# Patient Record
Sex: FEMALE | Race: ASIAN | NOT HISPANIC OR LATINO
[De-identification: names, ages, dates, MRNs, and addresses within clinical notes are randomized per-mention and may not be internally consistent; named-entity substitution may affect disease eponyms.]

---

## 2023-06-22 PROBLEM — Z00.00 ENCOUNTER FOR PREVENTIVE HEALTH EXAMINATION: Status: ACTIVE | Noted: 2023-06-22

## 2023-07-06 ENCOUNTER — APPOINTMENT (OUTPATIENT)
Dept: OBGYN | Facility: CLINIC | Age: 33
End: 2023-07-06
Payer: COMMERCIAL

## 2023-07-06 PROCEDURE — 36415 COLL VENOUS BLD VENIPUNCTURE: CPT

## 2023-07-06 PROCEDURE — 76830 TRANSVAGINAL US NON-OB: CPT

## 2023-07-06 PROCEDURE — 99214 OFFICE O/P EST MOD 30 MIN: CPT | Mod: 25

## 2023-07-19 ENCOUNTER — APPOINTMENT (OUTPATIENT)
Dept: OBGYN | Facility: CLINIC | Age: 33
End: 2023-07-19
Payer: COMMERCIAL

## 2023-07-19 PROCEDURE — 36415 COLL VENOUS BLD VENIPUNCTURE: CPT

## 2023-07-19 PROCEDURE — 0500F INITIAL PRENATAL CARE VISIT: CPT

## 2023-08-09 ENCOUNTER — APPOINTMENT (OUTPATIENT)
Dept: OBGYN | Facility: CLINIC | Age: 33
End: 2023-08-09
Payer: COMMERCIAL

## 2023-08-09 PROCEDURE — 0502F SUBSEQUENT PRENATAL CARE: CPT

## 2023-08-09 PROCEDURE — 36415 COLL VENOUS BLD VENIPUNCTURE: CPT

## 2023-08-09 PROCEDURE — 76813 OB US NUCHAL MEAS 1 GEST: CPT

## 2023-09-05 ENCOUNTER — APPOINTMENT (OUTPATIENT)
Dept: OBGYN | Facility: CLINIC | Age: 33
End: 2023-09-05
Payer: COMMERCIAL

## 2023-09-05 PROCEDURE — 0502F SUBSEQUENT PRENATAL CARE: CPT

## 2023-09-05 PROCEDURE — 36415 COLL VENOUS BLD VENIPUNCTURE: CPT

## 2023-09-20 ENCOUNTER — TRANSCRIPTION ENCOUNTER (OUTPATIENT)
Age: 33
End: 2023-09-20

## 2023-10-03 ENCOUNTER — ASOB RESULT (OUTPATIENT)
Age: 33
End: 2023-10-03

## 2023-10-03 ENCOUNTER — APPOINTMENT (OUTPATIENT)
Dept: ANTEPARTUM | Facility: CLINIC | Age: 33
End: 2023-10-03
Payer: COMMERCIAL

## 2023-10-03 PROCEDURE — 76811 OB US DETAILED SNGL FETUS: CPT

## 2023-10-10 ENCOUNTER — APPOINTMENT (OUTPATIENT)
Dept: OBGYN | Facility: CLINIC | Age: 33
End: 2023-10-10
Payer: COMMERCIAL

## 2023-10-10 PROCEDURE — 90471 IMMUNIZATION ADMIN: CPT

## 2023-10-10 PROCEDURE — 90686 IIV4 VACC NO PRSV 0.5 ML IM: CPT

## 2023-10-10 PROCEDURE — 0502F SUBSEQUENT PRENATAL CARE: CPT

## 2023-10-30 ENCOUNTER — APPOINTMENT (OUTPATIENT)
Dept: OBGYN | Facility: CLINIC | Age: 33
End: 2023-10-30
Payer: COMMERCIAL

## 2023-10-30 PROCEDURE — 0502F SUBSEQUENT PRENATAL CARE: CPT

## 2023-11-20 ENCOUNTER — APPOINTMENT (OUTPATIENT)
Dept: OBGYN | Facility: CLINIC | Age: 33
End: 2023-11-20
Payer: COMMERCIAL

## 2023-11-20 PROCEDURE — 0502F SUBSEQUENT PRENATAL CARE: CPT

## 2023-11-20 PROCEDURE — 90471 IMMUNIZATION ADMIN: CPT

## 2023-11-20 PROCEDURE — 36415 COLL VENOUS BLD VENIPUNCTURE: CPT

## 2023-11-20 PROCEDURE — 90715 TDAP VACCINE 7 YRS/> IM: CPT

## 2023-12-18 ENCOUNTER — APPOINTMENT (OUTPATIENT)
Dept: OBGYN | Facility: CLINIC | Age: 33
End: 2023-12-18
Payer: COMMERCIAL

## 2023-12-18 PROCEDURE — 76819 FETAL BIOPHYS PROFIL W/O NST: CPT | Mod: 59

## 2023-12-18 PROCEDURE — 0502F SUBSEQUENT PRENATAL CARE: CPT

## 2023-12-18 PROCEDURE — 76816 OB US FOLLOW-UP PER FETUS: CPT

## 2024-01-03 ENCOUNTER — APPOINTMENT (OUTPATIENT)
Dept: OBGYN | Facility: CLINIC | Age: 34
End: 2024-01-03
Payer: COMMERCIAL

## 2024-01-03 PROCEDURE — 0502F SUBSEQUENT PRENATAL CARE: CPT

## 2024-01-17 ENCOUNTER — APPOINTMENT (OUTPATIENT)
Dept: OBGYN | Facility: CLINIC | Age: 34
End: 2024-01-17
Payer: COMMERCIAL

## 2024-01-17 PROCEDURE — 90471 IMMUNIZATION ADMIN: CPT

## 2024-01-17 PROCEDURE — 90678 RSV VACC PREF BIVALENT IM: CPT

## 2024-01-17 PROCEDURE — 0502F SUBSEQUENT PRENATAL CARE: CPT

## 2024-01-24 ENCOUNTER — APPOINTMENT (OUTPATIENT)
Dept: OBGYN | Facility: CLINIC | Age: 34
End: 2024-01-24
Payer: COMMERCIAL

## 2024-01-24 PROCEDURE — 0502F SUBSEQUENT PRENATAL CARE: CPT

## 2024-01-24 PROCEDURE — 76816 OB US FOLLOW-UP PER FETUS: CPT

## 2024-01-24 PROCEDURE — 76819 FETAL BIOPHYS PROFIL W/O NST: CPT | Mod: 59

## 2024-01-26 ENCOUNTER — INPATIENT (INPATIENT)
Facility: HOSPITAL | Age: 34
LOS: 2 days | Discharge: HOME | End: 2024-01-29
Attending: OBSTETRICS & GYNECOLOGY | Admitting: OBSTETRICS & GYNECOLOGY
Payer: COMMERCIAL

## 2024-01-26 ENCOUNTER — TRANSCRIPTION ENCOUNTER (OUTPATIENT)
Age: 34
End: 2024-01-26

## 2024-01-26 VITALS — OXYGEN SATURATION: 97 %

## 2024-01-26 DIAGNOSIS — O26.899 OTHER SPECIFIED PREGNANCY RELATED CONDITIONS, UNSPECIFIED TRIMESTER: ICD-10-CM

## 2024-01-26 DIAGNOSIS — Z34.80 ENCOUNTER FOR SUPERVISION OF OTHER NORMAL PREGNANCY, UNSPECIFIED TRIMESTER: ICD-10-CM

## 2024-01-26 LAB
ALBUMIN SERPL ELPH-MCNC: 3.7 G/DL — SIGNIFICANT CHANGE UP (ref 3.3–5)
ALP SERPL-CCNC: 168 U/L — HIGH (ref 40–120)
ALT FLD-CCNC: 21 U/L — SIGNIFICANT CHANGE UP (ref 10–45)
ANION GAP SERPL CALC-SCNC: 14 MMOL/L — SIGNIFICANT CHANGE UP (ref 5–17)
APTT BLD: 28.3 SEC — SIGNIFICANT CHANGE UP (ref 24.5–35.6)
AST SERPL-CCNC: 25 U/L — SIGNIFICANT CHANGE UP (ref 10–40)
BASOPHILS # BLD AUTO: 0.02 K/UL — SIGNIFICANT CHANGE UP (ref 0–0.2)
BASOPHILS NFR BLD AUTO: 0.2 % — SIGNIFICANT CHANGE UP (ref 0–2)
BILIRUB SERPL-MCNC: 0.2 MG/DL — SIGNIFICANT CHANGE UP (ref 0.2–1.2)
BLD GP AB SCN SERPL QL: NEGATIVE — SIGNIFICANT CHANGE UP
BUN SERPL-MCNC: 8 MG/DL — SIGNIFICANT CHANGE UP (ref 7–23)
CALCIUM SERPL-MCNC: 9.9 MG/DL — SIGNIFICANT CHANGE UP (ref 8.4–10.5)
CHLORIDE SERPL-SCNC: 100 MMOL/L — SIGNIFICANT CHANGE UP (ref 96–108)
CO2 SERPL-SCNC: 21 MMOL/L — LOW (ref 22–31)
CREAT SERPL-MCNC: 0.43 MG/DL — LOW (ref 0.5–1.3)
EGFR: 132 ML/MIN/1.73M2 — SIGNIFICANT CHANGE UP
EOSINOPHIL # BLD AUTO: 0.04 K/UL — SIGNIFICANT CHANGE UP (ref 0–0.5)
EOSINOPHIL NFR BLD AUTO: 0.4 % — SIGNIFICANT CHANGE UP (ref 0–6)
FIBRINOGEN PPP-MCNC: 570 MG/DL — HIGH (ref 200–445)
GLUCOSE SERPL-MCNC: 116 MG/DL — HIGH (ref 70–99)
HCT VFR BLD CALC: 41.2 % — SIGNIFICANT CHANGE UP (ref 34.5–45)
HGB BLD-MCNC: 14.1 G/DL — SIGNIFICANT CHANGE UP (ref 11.5–15.5)
IMM GRANULOCYTES NFR BLD AUTO: 0.5 % — SIGNIFICANT CHANGE UP (ref 0–0.9)
INR BLD: 0.93 RATIO — SIGNIFICANT CHANGE UP (ref 0.85–1.18)
LDH SERPL L TO P-CCNC: 188 U/L — SIGNIFICANT CHANGE UP (ref 50–242)
LYMPHOCYTES # BLD AUTO: 1.75 K/UL — SIGNIFICANT CHANGE UP (ref 1–3.3)
LYMPHOCYTES # BLD AUTO: 17.3 % — SIGNIFICANT CHANGE UP (ref 13–44)
MCHC RBC-ENTMCNC: 30.7 PG — SIGNIFICANT CHANGE UP (ref 27–34)
MCHC RBC-ENTMCNC: 34.2 GM/DL — SIGNIFICANT CHANGE UP (ref 32–36)
MCV RBC AUTO: 89.6 FL — SIGNIFICANT CHANGE UP (ref 80–100)
MONOCYTES # BLD AUTO: 0.93 K/UL — HIGH (ref 0–0.9)
MONOCYTES NFR BLD AUTO: 9.2 % — SIGNIFICANT CHANGE UP (ref 2–14)
NEUTROPHILS # BLD AUTO: 7.33 K/UL — SIGNIFICANT CHANGE UP (ref 1.8–7.4)
NEUTROPHILS NFR BLD AUTO: 72.4 % — SIGNIFICANT CHANGE UP (ref 43–77)
NRBC # BLD: 0 /100 WBCS — SIGNIFICANT CHANGE UP (ref 0–0)
PLATELET # BLD AUTO: 272 K/UL — SIGNIFICANT CHANGE UP (ref 150–400)
POTASSIUM SERPL-MCNC: 3.6 MMOL/L — SIGNIFICANT CHANGE UP (ref 3.5–5.3)
POTASSIUM SERPL-SCNC: 3.6 MMOL/L — SIGNIFICANT CHANGE UP (ref 3.5–5.3)
PROT SERPL-MCNC: 6.5 G/DL — SIGNIFICANT CHANGE UP (ref 6–8.3)
PROTHROM AB SERPL-ACNC: 9.8 SEC — SIGNIFICANT CHANGE UP (ref 9.5–13)
RBC # BLD: 4.6 M/UL — SIGNIFICANT CHANGE UP (ref 3.8–5.2)
RBC # FLD: 14 % — SIGNIFICANT CHANGE UP (ref 10.3–14.5)
RH IG SCN BLD-IMP: POSITIVE — SIGNIFICANT CHANGE UP
SODIUM SERPL-SCNC: 135 MMOL/L — SIGNIFICANT CHANGE UP (ref 135–145)
URATE SERPL-MCNC: 4 MG/DL — SIGNIFICANT CHANGE UP (ref 2.5–7)
WBC # BLD: 10.12 K/UL — SIGNIFICANT CHANGE UP (ref 3.8–10.5)
WBC # FLD AUTO: 10.12 K/UL — SIGNIFICANT CHANGE UP (ref 3.8–10.5)

## 2024-01-26 RX ORDER — CHLORHEXIDINE GLUCONATE 213 G/1000ML
1 SOLUTION TOPICAL DAILY
Refills: 0 | Status: DISCONTINUED | OUTPATIENT
Start: 2024-01-26 | End: 2024-01-27

## 2024-01-26 RX ORDER — OXYTOCIN 10 UNIT/ML
333.33 VIAL (ML) INJECTION
Qty: 20 | Refills: 0 | Status: DISCONTINUED | OUTPATIENT
Start: 2024-01-26 | End: 2024-01-27

## 2024-01-26 RX ORDER — SODIUM CHLORIDE 9 MG/ML
1000 INJECTION, SOLUTION INTRAVENOUS
Refills: 0 | Status: DISCONTINUED | OUTPATIENT
Start: 2024-01-26 | End: 2024-01-27

## 2024-01-26 RX ORDER — CITRIC ACID/SODIUM CITRATE 300-500 MG
15 SOLUTION, ORAL ORAL EVERY 6 HOURS
Refills: 0 | Status: DISCONTINUED | OUTPATIENT
Start: 2024-01-26 | End: 2024-01-27

## 2024-01-26 NOTE — OB RN PATIENT PROFILE - RUBELLA: DATE, OB PROFILE
Message sent to dr. Aixa Pugh about consult via perfect serve. Message read.     Dr. Lance Orellana notified about admission, orders received 06-Jul-2023 07-Jul-2023

## 2024-01-26 NOTE — OB PROVIDER H&P - NSHPPHYSICALEXAM_GEN_ALL_CORE
T(C): 36.6 (01-26-24 @ 22:07), Max: 36.6 (01-26-24 @ 22:02)  HR: 98 (01-26-24 @ 22:41) (98 - 209)  BP: 145/101 (01-26-24 @ 22:18) (145/101 - 155/103)  RR: 18 (01-26-24 @ 22:07) (18 - 18)  SpO2: 96% (01-26-24 @ 22:41) (86% - 98%)    Gen: NAD, well-appearing, AAOx3   Abd: Soft, gravid  Ext: non-tender, non-edematous  SSE: +pooling clear fluid, +nitrazine  SVE:  1/70/-3  Bedside sono: vertex  FHT: 140bpm, mod, +accel, -decels  [place]: q2-5 min

## 2024-01-26 NOTE — OB PROVIDER H&P - ASSESSMENT
A/P: 33y  at 37w1d GA admitted to L&D for PROM@9:30pm  -Admit to L&D  -Admission labs  -CLD  -IV fluids  -Labor: IOL with PO cytotec, cervical balloon, pitocin  -Fetus: Cat I tracing. Continuous toco and fetal monitoring.   -GBS: Negative, no GBS ppx required   -HELLP labs for elevated BP on admission  -Analgesia: epidural PRN    Discussed with Dr. Renato Schaefer PGY2

## 2024-01-26 NOTE — OB PROVIDER H&P - ATTENDING COMMENTS
Agree with above  Patient with PROM  PNC complicated by mild range BPs  Will admit for IOL for PROM with PO/CB and pitocin in AM  HELLP labs    kayden anderson

## 2024-01-26 NOTE — OB PROVIDER H&P - HISTORY OF PRESENT ILLNESS
33y  at 37w1d GA who presents to L&D with complaints of leakage of "yellowish" fluid since 9:30pm.  Endorses decreased fetal movement since that time.  Patient with contractions on toco, although patient not feeling contractions.  -VB Denies fevers, chills, nausea, vomiting, chest pain, SOB, dizziness, RUQ pain, epigastric pain, and headache. No other complaints at this time.     FRAN: 2/15/24  EFW: 2950 by patient report from sono two days ago  GBS: negative    Prenatal course is significant for: BP in 140/90s at most recent OBGYN visit.  Has been monitoring at home with similar values.  Has not been diagnosed with hypertensive disorder during this visit.      Obhx:  - current  Gynhx: Denies fibroids, ovarian cysts, STIs, abnormal PAPs   PMH: Denies, including HTN, DM, asthma  PSH: Denies  Soc hx: Denies alcohol, tobacco and illicit drug use during this pregnancy  Anx/dep:  Denies  Meds: PNV, Bonyazanstjb  Allergies: NKDA    Will accept blood transfusion

## 2024-01-26 NOTE — OB PROVIDER H&P - NSLOWPPHRISK_OBGYN_A_OB
No previous uterine incision/Mortensen Pregnancy/Less than or equal to 4 previous vaginal births/No known bleeding disorder/No history of postpartum hemorrhage/No other PPH risks indicated

## 2024-01-26 NOTE — OB RN TRIAGE NOTE - FALL HARM RISK - UNIVERSAL INTERVENTIONS
Bed in lowest position, wheels locked, appropriate side rails in place/Call bell, personal items and telephone in reach/Instruct patient to call for assistance before getting out of bed or chair/Non-slip footwear when patient is out of bed/[place] to call system/Physically safe environment - no spills, clutter or unnecessary equipment/Purposeful Proactive Rounding/Room/bathroom lighting operational, light cord in reach

## 2024-01-26 NOTE — OB RN PATIENT PROFILE - BP NONINVASIVE SYSTOLIC (MM HG)
High Dose Vitamin A Pregnancy And Lactation Text: High dose vitamin A therapy is contraindicated during pregnancy and breast feeding. 155

## 2024-01-26 NOTE — PRE-ANESTHESIA EVALUATION ADULT - NSANTHTOBACCOSD_GEN_ALL_CORE
Paged Dr. Banks of new pt consult for COVID pneum. No acute distress noted. Endorse to AM shift nurse.    No

## 2024-01-27 LAB
APPEARANCE UR: CLEAR — SIGNIFICANT CHANGE UP
BILIRUB UR-MCNC: NEGATIVE — SIGNIFICANT CHANGE UP
COLOR SPEC: YELLOW — SIGNIFICANT CHANGE UP
CREAT ?TM UR-MCNC: 46 MG/DL — SIGNIFICANT CHANGE UP
DIFF PNL FLD: NEGATIVE — SIGNIFICANT CHANGE UP
GLUCOSE UR QL: NEGATIVE MG/DL — SIGNIFICANT CHANGE UP
KETONES UR-MCNC: NEGATIVE MG/DL — SIGNIFICANT CHANGE UP
LEUKOCYTE ESTERASE UR-ACNC: NEGATIVE — SIGNIFICANT CHANGE UP
MAGNESIUM SERPL-MCNC: 4.7 MG/DL — HIGH (ref 1.6–2.6)
NITRITE UR-MCNC: NEGATIVE — SIGNIFICANT CHANGE UP
PH UR: 6.5 — SIGNIFICANT CHANGE UP (ref 5–8)
PROT ?TM UR-MCNC: 9 MG/DL — SIGNIFICANT CHANGE UP (ref 0–12)
PROT UR-MCNC: NEGATIVE MG/DL — SIGNIFICANT CHANGE UP
PROT/CREAT UR-RTO: 0.2 RATIO — SIGNIFICANT CHANGE UP (ref 0–0.2)
SP GR SPEC: 1.01 — SIGNIFICANT CHANGE UP (ref 1–1.03)
T PALLIDUM AB TITR SER: NEGATIVE — SIGNIFICANT CHANGE UP
UROBILINOGEN FLD QL: 0.2 MG/DL — SIGNIFICANT CHANGE UP (ref 0.2–1)

## 2024-01-27 PROCEDURE — 59400 OBSTETRICAL CARE: CPT

## 2024-01-27 RX ORDER — ACETAMINOPHEN 500 MG
975 TABLET ORAL
Refills: 0 | Status: DISCONTINUED | OUTPATIENT
Start: 2024-01-27 | End: 2024-01-29

## 2024-01-27 RX ORDER — OXYTOCIN 10 UNIT/ML
10 VIAL (ML) INJECTION ONCE
Refills: 0 | Status: COMPLETED | OUTPATIENT
Start: 2024-01-27 | End: 2024-01-27

## 2024-01-27 RX ORDER — LABETALOL HCL 100 MG
20 TABLET ORAL ONCE
Refills: 0 | Status: COMPLETED | OUTPATIENT
Start: 2024-01-27 | End: 2024-01-27

## 2024-01-27 RX ORDER — OXYTOCIN 10 UNIT/ML
4 VIAL (ML) INJECTION
Qty: 30 | Refills: 0 | Status: DISCONTINUED | OUTPATIENT
Start: 2024-01-27 | End: 2024-01-27

## 2024-01-27 RX ORDER — SODIUM CHLORIDE 9 MG/ML
1000 INJECTION, SOLUTION INTRAVENOUS
Refills: 0 | Status: DISCONTINUED | OUTPATIENT
Start: 2024-01-27 | End: 2024-01-29

## 2024-01-27 RX ORDER — IBUPROFEN 200 MG
600 TABLET ORAL EVERY 6 HOURS
Refills: 0 | Status: COMPLETED | OUTPATIENT
Start: 2024-01-27 | End: 2024-12-25

## 2024-01-27 RX ORDER — SIMETHICONE 80 MG/1
80 TABLET, CHEWABLE ORAL EVERY 4 HOURS
Refills: 0 | Status: DISCONTINUED | OUTPATIENT
Start: 2024-01-27 | End: 2024-01-29

## 2024-01-27 RX ORDER — PRAMOXINE HYDROCHLORIDE 150 MG/15G
1 AEROSOL, FOAM RECTAL EVERY 4 HOURS
Refills: 0 | Status: DISCONTINUED | OUTPATIENT
Start: 2024-01-27 | End: 2024-01-29

## 2024-01-27 RX ORDER — MAGNESIUM SULFATE 500 MG/ML
4 VIAL (ML) INJECTION ONCE
Refills: 0 | Status: DISCONTINUED | OUTPATIENT
Start: 2024-01-27 | End: 2024-01-27

## 2024-01-27 RX ORDER — DIPHENOXYLATE HCL/ATROPINE 2.5-.025MG
1 TABLET ORAL ONCE
Refills: 0 | Status: DISCONTINUED | OUTPATIENT
Start: 2024-01-27 | End: 2024-01-27

## 2024-01-27 RX ORDER — CARBOPROST TROMETHAMINE 250 UG/ML
250 INJECTION, SOLUTION INTRAMUSCULAR ONCE
Refills: 0 | Status: COMPLETED | OUTPATIENT
Start: 2024-01-27 | End: 2024-01-27

## 2024-01-27 RX ORDER — MAGNESIUM SULFATE 500 MG/ML
2 VIAL (ML) INJECTION
Qty: 40 | Refills: 0 | Status: DISCONTINUED | OUTPATIENT
Start: 2024-01-27 | End: 2024-01-29

## 2024-01-27 RX ORDER — IBUPROFEN 200 MG
600 TABLET ORAL EVERY 6 HOURS
Refills: 0 | Status: DISCONTINUED | OUTPATIENT
Start: 2024-01-27 | End: 2024-01-29

## 2024-01-27 RX ORDER — SENNA PLUS 8.6 MG/1
2 TABLET ORAL AT BEDTIME
Refills: 0 | Status: DISCONTINUED | OUTPATIENT
Start: 2024-01-27 | End: 2024-01-29

## 2024-01-27 RX ORDER — HYDROCORTISONE 1 %
1 OINTMENT (GRAM) TOPICAL EVERY 6 HOURS
Refills: 0 | Status: DISCONTINUED | OUTPATIENT
Start: 2024-01-27 | End: 2024-01-29

## 2024-01-27 RX ORDER — MAGNESIUM SULFATE 500 MG/ML
2 VIAL (ML) INJECTION
Qty: 40 | Refills: 0 | Status: DISCONTINUED | OUTPATIENT
Start: 2024-01-27 | End: 2024-01-27

## 2024-01-27 RX ORDER — LANOLIN
1 OINTMENT (GRAM) TOPICAL EVERY 6 HOURS
Refills: 0 | Status: DISCONTINUED | OUTPATIENT
Start: 2024-01-27 | End: 2024-01-29

## 2024-01-27 RX ORDER — OXYTOCIN 10 UNIT/ML
41.67 VIAL (ML) INJECTION
Qty: 20 | Refills: 0 | Status: DISCONTINUED | OUTPATIENT
Start: 2024-01-27 | End: 2024-01-29

## 2024-01-27 RX ORDER — MAGNESIUM HYDROXIDE 400 MG/1
30 TABLET, CHEWABLE ORAL
Refills: 0 | Status: DISCONTINUED | OUTPATIENT
Start: 2024-01-27 | End: 2024-01-29

## 2024-01-27 RX ORDER — DIPHENHYDRAMINE HCL 50 MG
25 CAPSULE ORAL EVERY 6 HOURS
Refills: 0 | Status: DISCONTINUED | OUTPATIENT
Start: 2024-01-27 | End: 2024-01-29

## 2024-01-27 RX ORDER — OXYCODONE HYDROCHLORIDE 5 MG/1
5 TABLET ORAL ONCE
Refills: 0 | Status: DISCONTINUED | OUTPATIENT
Start: 2024-01-27 | End: 2024-01-29

## 2024-01-27 RX ORDER — DIBUCAINE 1 %
1 OINTMENT (GRAM) RECTAL EVERY 6 HOURS
Refills: 0 | Status: DISCONTINUED | OUTPATIENT
Start: 2024-01-27 | End: 2024-01-29

## 2024-01-27 RX ORDER — SODIUM CHLORIDE 9 MG/ML
500 INJECTION INTRAMUSCULAR; INTRAVENOUS; SUBCUTANEOUS ONCE
Refills: 0 | Status: COMPLETED | OUTPATIENT
Start: 2024-01-27 | End: 2024-01-27

## 2024-01-27 RX ORDER — TETANUS TOXOID, REDUCED DIPHTHERIA TOXOID AND ACELLULAR PERTUSSIS VACCINE, ADSORBED 5; 2.5; 8; 8; 2.5 [IU]/.5ML; [IU]/.5ML; UG/.5ML; UG/.5ML; UG/.5ML
0.5 SUSPENSION INTRAMUSCULAR ONCE
Refills: 0 | Status: DISCONTINUED | OUTPATIENT
Start: 2024-01-27 | End: 2024-01-29

## 2024-01-27 RX ORDER — SODIUM CHLORIDE 9 MG/ML
3 INJECTION INTRAMUSCULAR; INTRAVENOUS; SUBCUTANEOUS EVERY 8 HOURS
Refills: 0 | Status: DISCONTINUED | OUTPATIENT
Start: 2024-01-27 | End: 2024-01-29

## 2024-01-27 RX ORDER — AER TRAVELER 0.5 G/1
1 SOLUTION RECTAL; TOPICAL EVERY 4 HOURS
Refills: 0 | Status: DISCONTINUED | OUTPATIENT
Start: 2024-01-27 | End: 2024-01-29

## 2024-01-27 RX ORDER — BENZOCAINE 10 %
1 GEL (GRAM) MUCOUS MEMBRANE EVERY 6 HOURS
Refills: 0 | Status: DISCONTINUED | OUTPATIENT
Start: 2024-01-27 | End: 2024-01-29

## 2024-01-27 RX ORDER — OXYCODONE HYDROCHLORIDE 5 MG/1
5 TABLET ORAL
Refills: 0 | Status: DISCONTINUED | OUTPATIENT
Start: 2024-01-27 | End: 2024-01-29

## 2024-01-27 RX ORDER — MAGNESIUM SULFATE 500 MG/ML
4 VIAL (ML) INJECTION ONCE
Refills: 0 | Status: COMPLETED | OUTPATIENT
Start: 2024-01-27 | End: 2024-01-27

## 2024-01-27 RX ORDER — SODIUM CHLORIDE 9 MG/ML
1000 INJECTION INTRAMUSCULAR; INTRAVENOUS; SUBCUTANEOUS
Refills: 0 | Status: DISCONTINUED | OUTPATIENT
Start: 2024-01-27 | End: 2024-01-27

## 2024-01-27 RX ORDER — OXYTOCIN 10 UNIT/ML
VIAL (ML) INJECTION
Qty: 30 | Refills: 0 | Status: DISCONTINUED | OUTPATIENT
Start: 2024-01-27 | End: 2024-01-27

## 2024-01-27 RX ORDER — SODIUM CHLORIDE 9 MG/ML
500 INJECTION, SOLUTION INTRAVENOUS ONCE
Refills: 0 | Status: COMPLETED | OUTPATIENT
Start: 2024-01-27 | End: 2024-01-27

## 2024-01-27 RX ORDER — KETOROLAC TROMETHAMINE 30 MG/ML
30 SYRINGE (ML) INJECTION ONCE
Refills: 0 | Status: DISCONTINUED | OUTPATIENT
Start: 2024-01-27 | End: 2024-01-27

## 2024-01-27 RX ADMIN — Medication 1 TABLET(S): at 14:46

## 2024-01-27 RX ADMIN — Medication 300 GRAM(S): at 13:40

## 2024-01-27 RX ADMIN — SENNA PLUS 2 TABLET(S): 8.6 TABLET ORAL at 21:39

## 2024-01-27 RX ADMIN — Medication 30 MILLIGRAM(S): at 16:38

## 2024-01-27 RX ADMIN — SODIUM CHLORIDE 1000 MILLILITER(S): 9 INJECTION INTRAMUSCULAR; INTRAVENOUS; SUBCUTANEOUS at 06:50

## 2024-01-27 RX ADMIN — Medication 4 MILLIUNIT(S)/MIN: at 04:02

## 2024-01-27 RX ADMIN — Medication 50 GM/HR: at 14:03

## 2024-01-27 RX ADMIN — Medication 10 UNIT(S): at 14:29

## 2024-01-27 RX ADMIN — SODIUM CHLORIDE 1000 MILLILITER(S): 9 INJECTION, SOLUTION INTRAVENOUS at 04:58

## 2024-01-27 RX ADMIN — Medication 20 MILLIGRAM(S): at 13:22

## 2024-01-27 RX ADMIN — Medication 600 MILLIGRAM(S): at 23:47

## 2024-01-27 RX ADMIN — Medication 30 MILLIGRAM(S): at 18:14

## 2024-01-27 RX ADMIN — Medication 2 MILLIUNIT(S)/MIN: at 07:36

## 2024-01-27 RX ADMIN — Medication 4 MILLIUNIT(S)/MIN: at 05:37

## 2024-01-27 RX ADMIN — CARBOPROST TROMETHAMINE 250 MICROGRAM(S): 250 INJECTION, SOLUTION INTRAMUSCULAR at 14:41

## 2024-01-27 RX ADMIN — Medication 975 MILLIGRAM(S): at 19:17

## 2024-01-27 NOTE — OB RN DELIVERY SUMMARY - NS_SEPSISRSKCALC_OBGYN_ALL_OB_FT
EOS calculated successfully. EOS Risk Factor: 0.5/1000 live births ([facility] national incidence); GA=37w2d; Temp=99.32; ROM=16.85; GBS='Negative'; Antibiotics='No antibiotics or any antibiotics < 2 hrs prior to birth'

## 2024-01-27 NOTE — OB PROVIDER LABOR PROGRESS NOTE - NS_SUBJECTIVE/OBJECTIVE_OBGYN_ALL_OB_FT
Pt pushing effectively but baby with deep variables
Patient seen for decelerations after Pitocin started  Cervical balloon noted to be dislodged and expelled, deflated and removed from vagina  Pitocin paused and patient repositioned
R3 OB Labor Note    S: Patient seen and examined at bedside.     T(C): 37.1 (01-27-24 @ 04:19), Max: 37.1 (01-27-24 @ 04:19)  HR: 103 (01-27-24 @ 06:42) (89 - 209)  BP: 118/57 (01-27-24 @ 06:34) (118/57 - 165/74)  BP(mean): --  ABP: --  ABP(mean): --  RR: 18 (01-27-24 @ 04:19) (18 - 18)  SpO2: 96% (01-27-24 @ 06:42) (91% - 99%)  Wt(kg): --  CVP(mm Hg): --  CI: --  CAPILLARY BLOOD GLUCOSE       N/A      01-26 @ 07:01  -  01-27 @ 06:46  --------------------------------------------------------  IN:    Lactated Ringers: 1000 mL    Lactated Ringers Bolus: 500 mL  Total IN: 1500 mL    OUT:  Total OUT: 0 mL    Total NET: 1500 mL
Pt examined for progress
At bedside for placement of CB, pt comfortable with epidural. Receiving PO cytotec.    Vital Signs Last 24 Hrs  T(C): 36.6 (26 Jan 2024 23:17), Max: 36.6 (26 Jan 2024 22:02)  T(F): 97.9 (26 Jan 2024 22:33), Max: 97.9 (26 Jan 2024 22:07)  HR: 111 (27 Jan 2024 00:26) (89 - 209)  BP: 137/89 (27 Jan 2024 00:26) (125/69 - 165/74)  RR: 18 (26 Jan 2024 23:17) (18 - 18)  SpO2: 97% (27 Jan 2024 00:26) (93% - 99%)
Pt examined for progress
Pt examined for increased pressure

## 2024-01-27 NOTE — OB RN DELIVERY SUMMARY - NSSELHIDDEN_OBGYN_ALL_OB_FT
[[email or web address]_ALL_OB_FT:[id number]],[NS_DeliveryAssist1_OBGYN_ALL_OB_FT:[id number]],[NS_DeliveryRN_OBGYN_ALL_OB_FT:[id number]]

## 2024-01-27 NOTE — OB NEONATOLOGY/PEDIATRICIAN DELIVERY SUMMARY - NSPEDSNEONOTESA_OBGYN_ALL_OB_FT
Per delivery room nurse:  Peds NP requested to attend VAVD, CII tracing. 37.2 wk baby girl born via VAVD on 24 @ 1421. 33 yr old  mother, O+, PNL neg, GBS neg 24. Maternal hx noncontributory except severe PEC on Mag. SROM clear at 2130. ROM: <17 hrs. Baby emerged with vacuum assist x2, no popoffs, NCx1 - had good tone, adequate color, spontaneous cry. Cord clamped at 30 seconds. Routine resuscitation. APGARS 8/9. Highest maternal temp 37.4C. EOS 0.49. Breast feeding. Consents hep B.

## 2024-01-27 NOTE — OB PROVIDER LABOR PROGRESS NOTE - NS_OBIHIFHRDETAILS_OBGYN_ALL_OB_FT
135/mod/+acel scalp stim, +late decel, +variable decels
145bpm, mod, -accels, +3-4 late decelerations with additional prolonged deceleration
baseline 140, mod variability, +accels, no decels
cat 2 with deep variables
cat 1 currently
cat 1
cat 1

## 2024-01-27 NOTE — OB PROVIDER DELIVERY SUMMARY - NSSELHIDDEN_OBGYN_ALL_OB_FT
[NS_DeliveryAttending1_OBGYN_ALL_OB_FT:[id number]],[NS_DeliveryAssist1_OBGYN_ALL_OB_FT:[id number]]

## 2024-01-27 NOTE — OB PROVIDER DELIVERY SUMMARY - NSPROVIDERDELIVERYNOTE_OBGYN_ALL_OB_FT
Pt noted to be +1 with recurrent deep variables with pushing. Fetal descent obtained to +2 with maternal pushing. Pt with persistent Cat II tracing with pushing. Vacuum assistance discussed with patient and partner. OB labor saftey officer made aware. Vacuum applied. 2 pulls preformed with contractions before delivery of the fetal head. No pop offs. Nuchal x1 noted and reduced. Compound presentation noted on anterior shoulder. Head, shoulders, and body delivered easily. Infant passed to mother. Cord was clamped and cut after delayed cord clamping.  Placenta delivered spontaneously, noted to be intact. Bimanual massage preformed. Uterine atony identified. 10u of IM pitocin, 1000mcg of rectal cytotec, and hemabate 250mcg administered with resolution of uterine atony. 3rd degree laceration identified. Rectal examination demonstrated intact rectal mucosa. Anal sphincter identified with Emelina clamps. Repaired in standard fashion with Vicryl suture. Vaginal exam revealed intact cervix, sulci, vaginal walls. Perineum with second degree laceration, repaired in standard fashion with Vicryl suture. Excellent hemostasis was noted. Patient stable. Count correct x 2.     Dictation:   Bharti Thorne, PGY-3 Pt noted to be +1 with recurrent deep variables with pushing. Fetal descent obtained to +2 with maternal pushing. Pt with persistent Cat II tracing with pushing. Vacuum assistance discussed with patient and partner. OB labor saftey officer made aware. Vacuum applied. 2 pulls preformed with contractions before delivery of the fetal head. No pop offs. Nuchal x1 noted and reduced. Compound presentation noted on anterior shoulder. Head, shoulders, and body delivered easily. Infant passed to mother. Cord was clamped and cut after delayed cord clamping.  Placenta delivered spontaneously, noted to be intact. Bimanual massage preformed. Uterine atony identified. 10u of IM pitocin, 1000mcg of rectal cytotec, and hemabate 250mcg administered with resolution of uterine atony. 3rd degree laceration identified. Rectal examination demonstrated intact rectal mucosa. Anal sphincter identified with Emelina clamps. Repaired in standard fashion with Vicryl suture. Vaginal exam revealed intact cervix, sulci, vaginal walls. Perineum with second degree laceration, repaired in standard fashion with Vicryl suture. Excellent hemostasis was noted. Patient stable. Count correct x 2.     Dictation: [id number]  Bharti Thorne, PGY-3

## 2024-01-27 NOTE — OB PROVIDER LABOR PROGRESS NOTE - NS_OBIHICONTRACTIONPATTERNDETAILS_OBGYN_ALL_OB_FT
every 2 min -- overall [place] units between 150-200u
every 2 min
every 2 min
q3-4min    Cook balloon inserted without difficulty, inflated 60/60. Pt tolerated well.
q3 min
q3m
every 4-5 min

## 2024-01-27 NOTE — OB PROVIDER LABOR PROGRESS NOTE - ASSESSMENT
labor down in high fowlers  start pushing in 30 min    kayden anderson
Continue pitocin  Baby tolerating high fowlers position with peanut ball  Continue amnioinfusion  BPs stable at mild range    kayden anderson
A/P:   - Labor: IOL for PROM 930p. On Pitocin, paused for cat 2 tracing. IUPC placed, AI to be started.   - Fetus: cat 2, reassuring given response to scalp   - GBS: neg   - Pain: epi in situ    Ela Ulloa, PGY-4  d/w Dr Gross  
Decision made to proceed with vacuum assisted delivery  All r/b/a discussed with patient and   Pt agrees    kayden anderson
Patient with cervical change, now s/p cervical balloon  Continue repositioning and resuscitation prn  To restart Pitocin after FHT improves  Continuous EFM/toco    Discussed with Dr. Renato Schaefer PGY2
Pt making progress  Continue pitocin and peanut ball    kayden anderson
P0 @ 37w2d admitted for PROM, receiving PO cytotec, CB placed. Labile BPs, HELLP labs wnl, will continue to monitor BPs. Cat I tracing.     - cont present management   - cont cytotec and CB  - will transition to pitocin @ 4a  - cont to monitor BPs  - cEFM/toco

## 2024-01-28 LAB
ALBUMIN SERPL ELPH-MCNC: 2.7 G/DL — LOW (ref 3.3–5)
ALP SERPL-CCNC: 125 U/L — HIGH (ref 40–120)
ALT FLD-CCNC: 18 U/L — SIGNIFICANT CHANGE UP (ref 10–45)
ANION GAP SERPL CALC-SCNC: 9 MMOL/L — SIGNIFICANT CHANGE UP (ref 5–17)
APTT BLD: 26.5 SEC — SIGNIFICANT CHANGE UP (ref 24.5–35.6)
AST SERPL-CCNC: 29 U/L — SIGNIFICANT CHANGE UP (ref 10–40)
BASOPHILS # BLD AUTO: 0.03 K/UL — SIGNIFICANT CHANGE UP (ref 0–0.2)
BASOPHILS NFR BLD AUTO: 0.2 % — SIGNIFICANT CHANGE UP (ref 0–2)
BILIRUB SERPL-MCNC: 0.3 MG/DL — SIGNIFICANT CHANGE UP (ref 0.2–1.2)
BUN SERPL-MCNC: 5 MG/DL — LOW (ref 7–23)
CALCIUM SERPL-MCNC: 7.4 MG/DL — LOW (ref 8.4–10.5)
CHLORIDE SERPL-SCNC: 102 MMOL/L — SIGNIFICANT CHANGE UP (ref 96–108)
CO2 SERPL-SCNC: 23 MMOL/L — SIGNIFICANT CHANGE UP (ref 22–31)
CREAT SERPL-MCNC: 0.54 MG/DL — SIGNIFICANT CHANGE UP (ref 0.5–1.3)
EGFR: 125 ML/MIN/1.73M2 — SIGNIFICANT CHANGE UP
EOSINOPHIL # BLD AUTO: 0.04 K/UL — SIGNIFICANT CHANGE UP (ref 0–0.5)
EOSINOPHIL NFR BLD AUTO: 0.2 % — SIGNIFICANT CHANGE UP (ref 0–6)
FIBRINOGEN PPP-MCNC: 513 MG/DL — HIGH (ref 200–445)
GLUCOSE SERPL-MCNC: 106 MG/DL — HIGH (ref 70–99)
HCT VFR BLD CALC: 34.1 % — LOW (ref 34.5–45)
HGB BLD-MCNC: 11.7 G/DL — SIGNIFICANT CHANGE UP (ref 11.5–15.5)
IMM GRANULOCYTES NFR BLD AUTO: 0.6 % — SIGNIFICANT CHANGE UP (ref 0–0.9)
INR BLD: 0.85 RATIO — SIGNIFICANT CHANGE UP (ref 0.85–1.18)
LDH SERPL L TO P-CCNC: 303 U/L — HIGH (ref 50–242)
LYMPHOCYTES # BLD AUTO: 1.32 K/UL — SIGNIFICANT CHANGE UP (ref 1–3.3)
LYMPHOCYTES # BLD AUTO: 7.6 % — LOW (ref 13–44)
MAGNESIUM SERPL-MCNC: 5.2 MG/DL — HIGH (ref 1.6–2.6)
MAGNESIUM SERPL-MCNC: 5.9 MG/DL — HIGH (ref 1.6–2.6)
MCHC RBC-ENTMCNC: 31.4 PG — SIGNIFICANT CHANGE UP (ref 27–34)
MCHC RBC-ENTMCNC: 34.3 GM/DL — SIGNIFICANT CHANGE UP (ref 32–36)
MCV RBC AUTO: 91.4 FL — SIGNIFICANT CHANGE UP (ref 80–100)
MONOCYTES # BLD AUTO: 1.41 K/UL — HIGH (ref 0–0.9)
MONOCYTES NFR BLD AUTO: 8.1 % — SIGNIFICANT CHANGE UP (ref 2–14)
NEUTROPHILS # BLD AUTO: 14.43 K/UL — HIGH (ref 1.8–7.4)
NEUTROPHILS NFR BLD AUTO: 83.3 % — HIGH (ref 43–77)
NRBC # BLD: 0 /100 WBCS — SIGNIFICANT CHANGE UP (ref 0–0)
PLATELET # BLD AUTO: 217 K/UL — SIGNIFICANT CHANGE UP (ref 150–400)
POTASSIUM SERPL-MCNC: 3.6 MMOL/L — SIGNIFICANT CHANGE UP (ref 3.5–5.3)
POTASSIUM SERPL-SCNC: 3.6 MMOL/L — SIGNIFICANT CHANGE UP (ref 3.5–5.3)
PROT SERPL-MCNC: 4.9 G/DL — LOW (ref 6–8.3)
PROTHROM AB SERPL-ACNC: 9.4 SEC — LOW (ref 9.5–13)
RBC # BLD: 3.73 M/UL — LOW (ref 3.8–5.2)
RBC # FLD: 14.4 % — SIGNIFICANT CHANGE UP (ref 10.3–14.5)
SODIUM SERPL-SCNC: 134 MMOL/L — LOW (ref 135–145)
URATE SERPL-MCNC: 4 MG/DL — SIGNIFICANT CHANGE UP (ref 2.5–7)
WBC # BLD: 17.33 K/UL — HIGH (ref 3.8–10.5)
WBC # FLD AUTO: 17.33 K/UL — HIGH (ref 3.8–10.5)

## 2024-01-28 RX ADMIN — Medication 600 MILLIGRAM(S): at 05:42

## 2024-01-28 RX ADMIN — Medication 975 MILLIGRAM(S): at 09:58

## 2024-01-28 RX ADMIN — Medication 975 MILLIGRAM(S): at 03:00

## 2024-01-28 RX ADMIN — Medication 1 TABLET(S): at 12:03

## 2024-01-28 RX ADMIN — Medication 975 MILLIGRAM(S): at 21:06

## 2024-01-28 RX ADMIN — Medication 975 MILLIGRAM(S): at 09:17

## 2024-01-28 RX ADMIN — Medication 600 MILLIGRAM(S): at 12:02

## 2024-01-28 RX ADMIN — Medication 600 MILLIGRAM(S): at 17:47

## 2024-01-28 RX ADMIN — Medication 975 MILLIGRAM(S): at 15:23

## 2024-01-28 RX ADMIN — Medication 975 MILLIGRAM(S): at 22:02

## 2024-01-28 RX ADMIN — Medication 600 MILLIGRAM(S): at 17:57

## 2024-01-28 RX ADMIN — SENNA PLUS 2 TABLET(S): 8.6 TABLET ORAL at 21:09

## 2024-01-28 RX ADMIN — Medication 975 MILLIGRAM(S): at 15:53

## 2024-01-28 RX ADMIN — Medication 600 MILLIGRAM(S): at 12:55

## 2024-01-28 NOTE — PROGRESS NOTE ADULT - SUBJECTIVE AND OBJECTIVE BOX
OB Progress Note: VAVD PPD#1    S: 34y/o now PPD#1 s/p VAVD. Patient feels well. Pain is well controlled. She is tolerating a regular diet and passing flatus. She is voiding spontaneously, and ambulating without difficulty. Denies CP/SOB. Denies lightheadedness/dizziness. Denies N/V. Patient denies HA, SOB, changes or spots in vision, new swelling in the hands and face, or RUQ/epigastric pain.     O:  Vitals:  Vital Signs Last 24 Hrs  T(C): 36.4 (28 Jan 2024 06:00), Max: 37.4 (27 Jan 2024 12:51)  T(F): 97.6 (28 Jan 2024 06:00), Max: 99.32 (27 Jan 2024 12:51)  HR: 89 (28 Jan 2024 06:00) (86 - 141)  BP: 115/76 (28 Jan 2024 06:00) (100/63 - 176/107)  BP(mean): 94 (27 Jan 2024 18:11) (94 - 94)  RR: 18 (28 Jan 2024 06:00) (16 - 19)  SpO2: 98% (28 Jan 2024 06:00) (91% - 100%)    Parameters below as of 28 Jan 2024 06:00  Patient On (Oxygen Delivery Method): room air      Labs:  Blood type: O Positive  Rubella IgG: RPR: Negative                          11.7   17.33<H> >-----------< 217    ( 01-28 @ 02:23 )             34.1<L>                        14.1   10.12 >-----------< 272    ( 01-26 @ 22:50 )             41.2    01-28-24 @ 02:23      134<L>  |  102  |  5<L>  ----------------------------<  106<H>  3.6   |  23  |  0.54    01-26-24 @ 22:50      135  |  100  |  8   ----------------------------<  116<H>  3.6   |  21<L>  |  0.43<L>        Ca    7.4<L>      28 Jan 2024 02:23  Ca    9.9      26 Jan 2024 22:50  Mg     5.2<H>     01-28  Mg     4.7<H>     01-27    TPro  4.9<L>  /  Alb  2.7<L>  /  TBili  0.3  /  DBili  x   /  AST  29  /  ALT  18  /  AlkPhos  125<H>  01-28-24 @ 02:23  TPro  6.5  /  Alb  3.7  /  TBili  0.2  /  DBili  x   /  AST  25  /  ALT  21  /  AlkPhos  168<H>  01-26-24 @ 22:50      MEDICATIONS  (STANDING):  acetaminophen     Tablet .. 975 milliGRAM(s) Oral <User Schedule>  diphtheria/tetanus/pertussis (acellular) Vaccine (Adacel) 0.5 milliLiter(s) IntraMuscular once  ibuprofen  Tablet. 600 milliGRAM(s) Oral every 6 hours  lactated ringers. 1000 milliLiter(s) (50 mL/Hr) IV Continuous <Continuous>  magnesium sulfate Infusion 2 Gm/Hr (50 mL/Hr) IV Continuous <Continuous>  magnesium sulfate Infusion 2 Gm/Hr (50 mL/Hr) IV Continuous <Continuous>  oxytocin Infusion 41.667 milliUNIT(s)/Min (125 mL/Hr) IV Continuous <Continuous>  prenatal multivitamin 1 Tablet(s) Oral daily  senna 2 Tablet(s) Oral at bedtime  sodium chloride 0.9% lock flush 3 milliLiter(s) IV Push every 8 hours    MEDICATIONS  (PRN):  benzocaine 20%/menthol 0.5% Spray 1 Spray(s) Topical every 6 hours PRN for Perineal discomfort  dibucaine 1% Ointment 1 Application(s) Topical every 6 hours PRN Perineal discomfort  diphenhydrAMINE 25 milliGRAM(s) Oral every 6 hours PRN Pruritus  hydrocortisone 1% Cream 1 Application(s) Topical every 6 hours PRN Moderate Pain (4-6)  lanolin Ointment 1 Application(s) Topical every 6 hours PRN nipple soreness  magnesium hydroxide Suspension 30 milliLiter(s) Oral two times a day PRN Constipation  oxyCODONE    IR 5 milliGRAM(s) Oral every 3 hours PRN Moderate to Severe Pain (4-10)  oxyCODONE    IR 5 milliGRAM(s) Oral once PRN Moderate to Severe Pain (4-10)  pramoxine 1%/zinc 5% Cream 1 Application(s) Topical every 4 hours PRN Moderate Pain (4-6)  simethicone 80 milliGRAM(s) Chew every 4 hours PRN Gas  witch hazel Pads 1 Application(s) Topical every 4 hours PRN Perineal discomfort      Physical Exam:  General: NAD  Abdomen: soft, non-tender, non-distended, fundus firm  Vaginal: Lochia wnl  Extremities: No erythema/edema

## 2024-01-28 NOTE — PROGRESS NOTE ADULT - ATTENDING COMMENTS
I agree with the resident's note above.    Patient is doing well. Pain is well controlled. Tolerating regular diet, ambulating, and voiding.  Vital signs stable  BPs controlled    Plan:  DC mag at 24 hrs pp  Continue BP monitoring  Not currently on any anti-hypertensives but may need to start if BPs trend up  Reg diet  Ambulating  Pain control  Routine postpartum care    gchow

## 2024-01-29 ENCOUNTER — TRANSCRIPTION ENCOUNTER (OUTPATIENT)
Age: 34
End: 2024-01-29

## 2024-01-29 VITALS
HEART RATE: 74 BPM | TEMPERATURE: 98 F | OXYGEN SATURATION: 96 % | SYSTOLIC BLOOD PRESSURE: 111 MMHG | RESPIRATION RATE: 18 BRPM | DIASTOLIC BLOOD PRESSURE: 69 MMHG

## 2024-01-29 PROCEDURE — 85610 PROTHROMBIN TIME: CPT

## 2024-01-29 PROCEDURE — 36415 COLL VENOUS BLD VENIPUNCTURE: CPT

## 2024-01-29 PROCEDURE — 86901 BLOOD TYPING SEROLOGIC RH(D): CPT

## 2024-01-29 PROCEDURE — 84550 ASSAY OF BLOOD/URIC ACID: CPT

## 2024-01-29 PROCEDURE — 85384 FIBRINOGEN ACTIVITY: CPT

## 2024-01-29 PROCEDURE — 83615 LACTATE (LD) (LDH) ENZYME: CPT

## 2024-01-29 PROCEDURE — 85025 COMPLETE CBC W/AUTO DIFF WBC: CPT

## 2024-01-29 PROCEDURE — 86900 BLOOD TYPING SEROLOGIC ABO: CPT

## 2024-01-29 PROCEDURE — 82570 ASSAY OF URINE CREATININE: CPT

## 2024-01-29 PROCEDURE — 81003 URINALYSIS AUTO W/O SCOPE: CPT

## 2024-01-29 PROCEDURE — 83735 ASSAY OF MAGNESIUM: CPT

## 2024-01-29 PROCEDURE — 85730 THROMBOPLASTIN TIME PARTIAL: CPT

## 2024-01-29 PROCEDURE — 59050 FETAL MONITOR W/REPORT: CPT

## 2024-01-29 PROCEDURE — 80053 COMPREHEN METABOLIC PANEL: CPT

## 2024-01-29 PROCEDURE — 86850 RBC ANTIBODY SCREEN: CPT

## 2024-01-29 PROCEDURE — 86780 TREPONEMA PALLIDUM: CPT

## 2024-01-29 PROCEDURE — 84156 ASSAY OF PROTEIN URINE: CPT

## 2024-01-29 RX ORDER — IBUPROFEN 200 MG
1 TABLET ORAL
Qty: 0 | Refills: 0 | DISCHARGE
Start: 2024-01-29

## 2024-01-29 RX ORDER — ACETAMINOPHEN 500 MG
3 TABLET ORAL
Qty: 0 | Refills: 0 | DISCHARGE
Start: 2024-01-29

## 2024-01-29 RX ADMIN — Medication 600 MILLIGRAM(S): at 06:21

## 2024-01-29 RX ADMIN — Medication 600 MILLIGRAM(S): at 01:10

## 2024-01-29 RX ADMIN — Medication 975 MILLIGRAM(S): at 08:12

## 2024-01-29 RX ADMIN — Medication 975 MILLIGRAM(S): at 08:42

## 2024-01-29 RX ADMIN — Medication 975 MILLIGRAM(S): at 02:27

## 2024-01-29 RX ADMIN — Medication 975 MILLIGRAM(S): at 03:22

## 2024-01-29 RX ADMIN — Medication 600 MILLIGRAM(S): at 00:11

## 2024-01-29 NOTE — DISCHARGE NOTE OB - INSTRUCTIONS
Follow up with discharge apt. as directed by doctor. Take BP 2 times a day and record. If BP is 150/90 call MD. If BP is 110/60 or lower hold medication times one hour then retake BP. If still Below 110/60 call the doctor. Follow up with discharge apt. as directed by doctor.

## 2024-01-29 NOTE — PROGRESS NOTE ADULT - ASSESSMENT
A/P: 34yo PPD#1 s/p VAVD c/b sPEC/Mg.   Patient is doing well postpartum.       #sPEC  - Mg for seizure ppx for 24hours   - email Cardio OB  - AM HELLP labs  - Monitor BPs    # PP care  - Pain well controlled, continue current pain regimen  - Increase ambulation, SCDs when not ambulating  - Continue regular diet  - s/p 3rd degree tear repair, healing well.   - lochia wnl    Tarcy Levin MD, PGY-3 
A/P: 34yo PPD#2 s/p VAVD c/b sPEC/Mg. Patient is doing well postpartum.     #sPEC  - -120/70-80s overnight, ctm BP  - s/p Mg for seizure ppx for 24hours   - email Cardio OB  - HELLP labs wnl  - No signs or symptoms of PEC at this time    # PP care  - Pain well controlled, continue current pain regimen  - Increase ambulation, SCDs when not ambulating  - Continue regular diet  - s/p 3rd degree tear repair, healing well.   - Discharge planning    CRISTAL Mora, PGY3

## 2024-01-29 NOTE — DISCHARGE NOTE OB - CARE PLAN
1 Principal Discharge DX:	 (normal spontaneous vaginal delivery)  Assessment and plan of treatment:	32 yo P1 s/p VAVD c/b sPEC requiring MgS04  BPs WNL  stable for discharge

## 2024-01-29 NOTE — DISCHARGE NOTE OB - CARE PROVIDER_API CALL
Roshni Gross  Obstetrics and Gynecology  [address] [place] [address], [facility] [address]  [place] [address]  Phone: [phone]  Fax: [phone]  Follow Up Time:

## 2024-01-29 NOTE — DISCHARGE NOTE OB - MEDICATION SUMMARY - MEDICATIONS TO TAKE
I will START or STAY ON the medications listed below when I get home from the hospital:    ibuprofen 600 mg oral tablet  -- 1 tab(s) by mouth every 6 hours  -- Indication: For Supervision of other normal pregnancy, antepartum    acetaminophen 325 mg oral tablet  -- 3 tab(s) by mouth every 6 hours  -- Indication: For Supervision of other normal pregnancy, antepartum

## 2024-01-29 NOTE — PROGRESS NOTE ADULT - NS PANP COMMENT GEN_ALL_CORE FT
I agree with the resident's note above.    Patient is doing well. Pain is well controlled. Tolerating regular diet, ambulating, and voiding.    32 yo P1 s/p VAVD c/b sPEC requiring MgS04  BPs WNL  no s/s of PEC  stable for discharge    Plan:  Discharge instructions reviewed  Pt to monitor BPs at home and call office with any values of >140/90 or s/s of PEC  Reg diet  Ambulating  Pain control  Routine postpartum care    maddie blair I agree with the resident's note above.    Patient is doing well. Pain is well controlled. Tolerating regular diet, ambulating, and voiding.    32 yo P1 s/p VAVD c/b sPEC requiring MgS04  BPs WNL  stable for discharge    Plan:  Discharge instructions reviewed  Pt to monitor BPs at home and call office with any values of >140/90 or s/s of PEC  Reg diet  Ambulating  Pain control  Routine postpartum care    maddie blair

## 2024-01-29 NOTE — PROGRESS NOTE ADULT - SUBJECTIVE AND OBJECTIVE BOX
OB Progress Note: VAVD PPD#2    S: 32yo  PPD#2 s/p VAVD. Patient feels well. Pain is well controlled. She is tolerating a regular diet and passing flatus. She is voiding spontaneously, and ambulating without difficulty. Denies CP/SOB. Denies HA/lightheadedness/dizziness. Denies N/V. Denies calf pain    O:  Vitals:   Vital Signs Last 24 Hrs  T(C): 36.4 (29 Jan 2024 00:38), Max: 36.8 (28 Jan 2024 08:15)  T(F): 97.5 (29 Jan 2024 00:38), Max: 98.3 (28 Jan 2024 08:15)  HR: 82 (29 Jan 2024 00:38) (82 - 96)  BP: 110/73 (29 Jan 2024 00:38) (110/73 - 130/77)  BP(mean): --  RR: 18 (29 Jan 2024 00:38) (18 - 18)  SpO2: 95% (29 Jan 2024 00:38) (95% - 98%)    Parameters below as of 29 Jan 2024 00:38  Patient On (Oxygen Delivery Method): room air        MEDICATIONS  (STANDING):  acetaminophen     Tablet .. 975 milliGRAM(s) Oral <User Schedule>  diphtheria/tetanus/pertussis (acellular) Vaccine (Adacel) 0.5 milliLiter(s) IntraMuscular once  ibuprofen  Tablet. 600 milliGRAM(s) Oral every 6 hours  lactated ringers. 1000 milliLiter(s) (50 mL/Hr) IV Continuous <Continuous>  magnesium sulfate Infusion 2 Gm/Hr (50 mL/Hr) IV Continuous <Continuous>  magnesium sulfate Infusion 2 Gm/Hr (50 mL/Hr) IV Continuous <Continuous>  oxytocin Infusion 41.667 milliUNIT(s)/Min (125 mL/Hr) IV Continuous <Continuous>  prenatal multivitamin 1 Tablet(s) Oral daily  senna 2 Tablet(s) Oral at bedtime  sodium chloride 0.9% lock flush 3 milliLiter(s) IV Push every 8 hours    MEDICATIONS  (PRN):  benzocaine 20%/menthol 0.5% Spray 1 Spray(s) Topical every 6 hours PRN for Perineal discomfort  dibucaine 1% Ointment 1 Application(s) Topical every 6 hours PRN Perineal discomfort  diphenhydrAMINE 25 milliGRAM(s) Oral every 6 hours PRN Pruritus  hydrocortisone 1% Cream 1 Application(s) Topical every 6 hours PRN Moderate Pain (4-6)  lanolin Ointment 1 Application(s) Topical every 6 hours PRN nipple soreness  magnesium hydroxide Suspension 30 milliLiter(s) Oral two times a day PRN Constipation  oxyCODONE    IR 5 milliGRAM(s) Oral every 3 hours PRN Moderate to Severe Pain (4-10)  oxyCODONE    IR 5 milliGRAM(s) Oral once PRN Moderate to Severe Pain (4-10)  pramoxine 1%/zinc 5% Cream 1 Application(s) Topical every 4 hours PRN Moderate Pain (4-6)  simethicone 80 milliGRAM(s) Chew every 4 hours PRN Gas  witch hazel Pads 1 Application(s) Topical every 4 hours PRN Perineal discomfort      Labs:  Blood type: O Positive  Rubella IgG: RPR: Negative                          11.7   17.33<H> >-----------< 217    ( 01-28 @ 02:23 )             34.1<L>                        14.1   10.12 >-----------< 272    ( 01-26 @ 22:50 )             41.2    01-28-24 @ 02:23      134<L>  |  102  |  5<L>  ----------------------------<  106<H>  3.6   |  23  |  0.54    01-26-24 @ 22:50      135  |  100  |  8   ----------------------------<  116<H>  3.6   |  21<L>  |  0.43<L>        Ca    7.4<L>      28 Jan 2024 02:23  Ca    9.9      26 Jan 2024 22:50  Mg     5.9<H>     01-28  Mg     5.2<H>     01-28  Mg     4.7<H>     01-27    TPro  4.9<L>  /  Alb  2.7<L>  /  TBili  0.3  /  DBili  x   /  AST  29  /  ALT  18  /  AlkPhos  125<H>  01-28-24 @ 02:23  TPro  6.5  /  Alb  3.7  /  TBili  0.2  /  DBili  x   /  AST  25  /  ALT  21  /  AlkPhos  168<H>  01-26-24 @ 22:50          Physical Exam:  General: NAD  CV: RRR  Pulm: CTAB  Abdomen: soft, non-tender, non-distended, fundus firm  Vaginal: lochia wnl, exam deferred  Extremities: No erythema/calf tenderness

## 2024-01-29 NOTE — DISCHARGE NOTE OB - PATIENT PORTAL LINK FT
You can access the FollowMyHealth Patient Portal offered by [facility] by registering at the following website: [email or web address]. By joining [employer]’s FollowMyHealth portal, you will also be able to view your health information using other applications (apps) compatible with our system.

## 2024-02-02 ENCOUNTER — APPOINTMENT (OUTPATIENT)
Dept: OBGYN | Facility: CLINIC | Age: 34
End: 2024-02-02
Payer: COMMERCIAL

## 2024-02-02 PROCEDURE — 99212 OFFICE O/P EST SF 10 MIN: CPT

## 2024-03-11 ENCOUNTER — APPOINTMENT (OUTPATIENT)
Dept: OBGYN | Facility: CLINIC | Age: 34
End: 2024-03-11
Payer: COMMERCIAL

## 2024-03-11 ENCOUNTER — NON-APPOINTMENT (OUTPATIENT)
Age: 34
End: 2024-03-11

## 2024-03-11 ENCOUNTER — APPOINTMENT (OUTPATIENT)
Dept: CARDIOLOGY | Facility: CLINIC | Age: 34
End: 2024-03-11
Payer: COMMERCIAL

## 2024-03-11 ENCOUNTER — APPOINTMENT (OUTPATIENT)
Dept: OBGYN | Facility: CLINIC | Age: 34
End: 2024-03-11

## 2024-03-11 VITALS
DIASTOLIC BLOOD PRESSURE: 81 MMHG | WEIGHT: 156 LBS | BODY MASS INDEX: 27.64 KG/M2 | HEIGHT: 63 IN | HEART RATE: 98 BPM | OXYGEN SATURATION: 95 % | SYSTOLIC BLOOD PRESSURE: 118 MMHG

## 2024-03-11 PROCEDURE — 0503F POSTPARTUM CARE VISIT: CPT

## 2024-03-11 PROCEDURE — 99203 OFFICE O/P NEW LOW 30 MIN: CPT

## 2024-03-11 PROCEDURE — 93000 ELECTROCARDIOGRAM COMPLETE: CPT

## 2024-03-11 NOTE — DISCUSSION/SUMMARY
[FreeTextEntry1] : 33 year old woman  who delivered at 37 weeks on 24. Her course was complicated by GHTN.  She delivered and BP normalized a few weeks after delivery EKG normal Now normotensive, fu as needed [EKG obtained to assist in diagnosis and management of assessed problem(s)] : EKG obtained to assist in diagnosis and management of assessed problem(s)

## 2024-03-11 NOTE — HISTORY OF PRESENT ILLNESS
[FreeTextEntry1] : 33 year old woman  who delivered at 37 weeks on 24. Her course was complicated by GHTN.  She delivered and BP normalized a few weeks after delivery EKG normal

## 2024-04-22 NOTE — OB RN DELIVERY SUMMARY - BABY A: APGAR 1 MIN REFLEX IRRITABILITY, DELIVERY
F: s/p 1L NS  E: replete as needed  N: CC diet  GI: pantoprazole 40 qd   DVT: Eliquis   Code: Full F: s/p 1L NS  E: replete as needed  N: CC diet  GI: pantoprazole 40 qd   DVT: Eliquis   Code: DNR trial NIV (2) cough or sneeze

## 2024-07-25 ENCOUNTER — APPOINTMENT (OUTPATIENT)
Dept: OBGYN | Facility: CLINIC | Age: 34
End: 2024-07-25
Payer: COMMERCIAL

## 2024-07-25 PROCEDURE — 99395 PREV VISIT EST AGE 18-39: CPT

## 2024-07-25 PROCEDURE — 96127 BRIEF EMOTIONAL/BEHAV ASSMT: CPT

## 2024-08-17 ENCOUNTER — TRANSCRIPTION ENCOUNTER (OUTPATIENT)
Age: 34
End: 2024-08-17

## 2024-08-18 ENCOUNTER — OUTPATIENT (OUTPATIENT)
Dept: EMERGENCY DEPT | Facility: HOSPITAL | Age: 34
LOS: 1 days | End: 2024-08-18
Payer: COMMERCIAL

## 2024-08-18 VITALS
OXYGEN SATURATION: 98 % | RESPIRATION RATE: 18 BRPM | TEMPERATURE: 98 F | HEART RATE: 106 BPM | SYSTOLIC BLOOD PRESSURE: 123 MMHG | DIASTOLIC BLOOD PRESSURE: 84 MMHG

## 2024-08-18 VITALS
SYSTOLIC BLOOD PRESSURE: 98 MMHG | DIASTOLIC BLOOD PRESSURE: 56 MMHG | HEART RATE: 74 BPM | RESPIRATION RATE: 15 BRPM | OXYGEN SATURATION: 99 %

## 2024-08-18 DIAGNOSIS — K35.80 UNSPECIFIED ACUTE APPENDICITIS: ICD-10-CM

## 2024-08-18 LAB
ALBUMIN SERPL ELPH-MCNC: 4.6 G/DL — SIGNIFICANT CHANGE UP (ref 3.3–5)
ALP SERPL-CCNC: 65 U/L — SIGNIFICANT CHANGE UP (ref 40–120)
ALT FLD-CCNC: 18 U/L — SIGNIFICANT CHANGE UP (ref 10–45)
ANION GAP SERPL CALC-SCNC: 14 MMOL/L — SIGNIFICANT CHANGE UP (ref 5–17)
APPEARANCE UR: CLEAR — SIGNIFICANT CHANGE UP
APTT BLD: 33 SEC — SIGNIFICANT CHANGE UP (ref 24.5–35.6)
AST SERPL-CCNC: 11 U/L — SIGNIFICANT CHANGE UP (ref 10–40)
BACTERIA # UR AUTO: NEGATIVE /HPF — SIGNIFICANT CHANGE UP
BASOPHILS # BLD AUTO: 0.03 K/UL — SIGNIFICANT CHANGE UP (ref 0–0.2)
BASOPHILS NFR BLD AUTO: 0.1 % — SIGNIFICANT CHANGE UP (ref 0–2)
BILIRUB SERPL-MCNC: 0.6 MG/DL — SIGNIFICANT CHANGE UP (ref 0.2–1.2)
BILIRUB UR-MCNC: NEGATIVE — SIGNIFICANT CHANGE UP
BLD GP AB SCN SERPL QL: NEGATIVE — SIGNIFICANT CHANGE UP
BUN SERPL-MCNC: 6 MG/DL — LOW (ref 7–23)
CALCIUM SERPL-MCNC: 9.5 MG/DL — SIGNIFICANT CHANGE UP (ref 8.4–10.5)
CAST: 0 /LPF — SIGNIFICANT CHANGE UP (ref 0–4)
CHLORIDE SERPL-SCNC: 99 MMOL/L — SIGNIFICANT CHANGE UP (ref 96–108)
CO2 SERPL-SCNC: 24 MMOL/L — SIGNIFICANT CHANGE UP (ref 22–31)
COLOR SPEC: YELLOW — SIGNIFICANT CHANGE UP
CREAT SERPL-MCNC: 0.57 MG/DL — SIGNIFICANT CHANGE UP (ref 0.5–1.3)
DIFF PNL FLD: NEGATIVE — SIGNIFICANT CHANGE UP
EGFR: 124 ML/MIN/1.73M2 — SIGNIFICANT CHANGE UP
EGFR: 124 ML/MIN/1.73M2 — SIGNIFICANT CHANGE UP
EOSINOPHIL # BLD AUTO: 0 K/UL — SIGNIFICANT CHANGE UP (ref 0–0.5)
EOSINOPHIL NFR BLD AUTO: 0 % — SIGNIFICANT CHANGE UP (ref 0–6)
GAS PNL BLDV: SIGNIFICANT CHANGE UP
GLUCOSE SERPL-MCNC: 135 MG/DL — HIGH (ref 70–99)
GLUCOSE UR QL: NEGATIVE MG/DL — SIGNIFICANT CHANGE UP
HCG SERPL-ACNC: <2 MIU/ML — SIGNIFICANT CHANGE UP
HCT VFR BLD CALC: 40.9 % — SIGNIFICANT CHANGE UP (ref 34.5–45)
HGB BLD-MCNC: 14 G/DL — SIGNIFICANT CHANGE UP (ref 11.5–15.5)
IMM GRANULOCYTES NFR BLD AUTO: 0.5 % — SIGNIFICANT CHANGE UP (ref 0–0.9)
INR BLD: 1.08 RATIO — SIGNIFICANT CHANGE UP (ref 0.85–1.18)
KETONES UR-MCNC: 15 MG/DL
LEUKOCYTE ESTERASE UR-ACNC: NEGATIVE — SIGNIFICANT CHANGE UP
LIDOCAIN IGE QN: 11 U/L — SIGNIFICANT CHANGE UP (ref 7–60)
LYMPHOCYTES # BLD AUTO: 0.94 K/UL — LOW (ref 1–3.3)
LYMPHOCYTES # BLD AUTO: 4.3 % — LOW (ref 13–44)
MCHC RBC-ENTMCNC: 30.4 PG — SIGNIFICANT CHANGE UP (ref 27–34)
MCHC RBC-ENTMCNC: 34.2 GM/DL — SIGNIFICANT CHANGE UP (ref 32–36)
MCV RBC AUTO: 88.9 FL — SIGNIFICANT CHANGE UP (ref 80–100)
MONOCYTES # BLD AUTO: 1.49 K/UL — HIGH (ref 0–0.9)
MONOCYTES NFR BLD AUTO: 6.8 % — SIGNIFICANT CHANGE UP (ref 2–14)
NEUTROPHILS # BLD AUTO: 19.27 K/UL — HIGH (ref 1.8–7.4)
NEUTROPHILS NFR BLD AUTO: 88.3 % — HIGH (ref 43–77)
NITRITE UR-MCNC: NEGATIVE — SIGNIFICANT CHANGE UP
NRBC # BLD: 0 /100 WBCS — SIGNIFICANT CHANGE UP (ref 0–0)
NRBC BLD-RTO: 0 /100 WBCS — SIGNIFICANT CHANGE UP (ref 0–0)
PH UR: 6.5 — SIGNIFICANT CHANGE UP (ref 5–8)
PLATELET # BLD AUTO: 276 K/UL — SIGNIFICANT CHANGE UP (ref 150–400)
POTASSIUM SERPL-MCNC: 3.5 MMOL/L — SIGNIFICANT CHANGE UP (ref 3.5–5.3)
POTASSIUM SERPL-SCNC: 3.5 MMOL/L — SIGNIFICANT CHANGE UP (ref 3.5–5.3)
PROT SERPL-MCNC: 7.4 G/DL — SIGNIFICANT CHANGE UP (ref 6–8.3)
PROT UR-MCNC: SIGNIFICANT CHANGE UP MG/DL
PROTHROM AB SERPL-ACNC: 11.9 SEC — SIGNIFICANT CHANGE UP (ref 9.5–13)
RBC # BLD: 4.6 M/UL — SIGNIFICANT CHANGE UP (ref 3.8–5.2)
RBC # FLD: 13 % — SIGNIFICANT CHANGE UP (ref 10.3–14.5)
RBC CASTS # UR COMP ASSIST: 0 /HPF — SIGNIFICANT CHANGE UP (ref 0–4)
REVIEW: SIGNIFICANT CHANGE UP
RH IG SCN BLD-IMP: POSITIVE — SIGNIFICANT CHANGE UP
SODIUM SERPL-SCNC: 137 MMOL/L — SIGNIFICANT CHANGE UP (ref 135–145)
SP GR SPEC: >1.03 — HIGH (ref 1–1.03)
SQUAMOUS # UR AUTO: 2 /HPF — SIGNIFICANT CHANGE UP (ref 0–5)
UROBILINOGEN FLD QL: 0.2 MG/DL — SIGNIFICANT CHANGE UP (ref 0.2–1)
WBC # BLD: 21.85 K/UL — HIGH (ref 3.8–10.5)
WBC # FLD AUTO: 21.85 K/UL — HIGH (ref 3.8–10.5)
WBC UR QL: 2 /HPF — SIGNIFICANT CHANGE UP (ref 0–5)

## 2024-08-18 PROCEDURE — 84295 ASSAY OF SERUM SODIUM: CPT

## 2024-08-18 PROCEDURE — 85610 PROTHROMBIN TIME: CPT

## 2024-08-18 PROCEDURE — 82947 ASSAY GLUCOSE BLOOD QUANT: CPT

## 2024-08-18 PROCEDURE — 85025 COMPLETE CBC W/AUTO DIFF WBC: CPT

## 2024-08-18 PROCEDURE — 86900 BLOOD TYPING SEROLOGIC ABO: CPT

## 2024-08-18 PROCEDURE — 83605 ASSAY OF LACTIC ACID: CPT

## 2024-08-18 PROCEDURE — C9399: CPT

## 2024-08-18 PROCEDURE — C1889: CPT

## 2024-08-18 PROCEDURE — 82435 ASSAY OF BLOOD CHLORIDE: CPT

## 2024-08-18 PROCEDURE — 80053 COMPREHEN METABOLIC PANEL: CPT

## 2024-08-18 PROCEDURE — 74177 CT ABD & PELVIS W/CONTRAST: CPT | Mod: MC

## 2024-08-18 PROCEDURE — 85018 HEMOGLOBIN: CPT

## 2024-08-18 PROCEDURE — 82330 ASSAY OF CALCIUM: CPT

## 2024-08-18 PROCEDURE — 81001 URINALYSIS AUTO W/SCOPE: CPT

## 2024-08-18 PROCEDURE — 82803 BLOOD GASES ANY COMBINATION: CPT

## 2024-08-18 PROCEDURE — 85730 THROMBOPLASTIN TIME PARTIAL: CPT

## 2024-08-18 PROCEDURE — 86901 BLOOD TYPING SEROLOGIC RH(D): CPT

## 2024-08-18 PROCEDURE — 86850 RBC ANTIBODY SCREEN: CPT

## 2024-08-18 PROCEDURE — 83690 ASSAY OF LIPASE: CPT

## 2024-08-18 PROCEDURE — 44970 LAPAROSCOPY APPENDECTOMY: CPT

## 2024-08-18 PROCEDURE — 84702 CHORIONIC GONADOTROPIN TEST: CPT

## 2024-08-18 PROCEDURE — 85014 HEMATOCRIT: CPT

## 2024-08-18 PROCEDURE — 84132 ASSAY OF SERUM POTASSIUM: CPT

## 2024-08-18 PROCEDURE — 87086 URINE CULTURE/COLONY COUNT: CPT

## 2024-08-18 DEVICE — STAPLER COVIDIEN TRI-STAPLE 60MM PURPLE RELOAD: Type: IMPLANTABLE DEVICE | Status: FUNCTIONAL

## 2024-08-18 RX ORDER — HYDROMORPHONE/SOD CHLOR,ISO/PF 2 MG/10 ML
0.5 SYRINGE (ML) INJECTION
Refills: 0 | Status: DISCONTINUED | OUTPATIENT
Start: 2024-08-18 | End: 2024-08-18

## 2024-08-18 RX ORDER — OXYCODONE HYDROCHLORIDE 30 MG/1
10 TABLET ORAL ONCE
Refills: 0 | Status: DISCONTINUED | OUTPATIENT
Start: 2024-08-18 | End: 2024-08-18

## 2024-08-18 RX ORDER — AMOXICILLIN AND CLAVULANATE POTASSIUM 500; 125 MG/1; MG/1
875 TABLET, FILM COATED ORAL
Qty: 8 | Refills: 0
Start: 2024-08-18 | End: 2024-08-21

## 2024-08-18 RX ORDER — ACETAMINOPHEN 500 MG/5ML
1000 LIQUID (ML) ORAL ONCE
Refills: 0 | Status: DISCONTINUED | OUTPATIENT
Start: 2024-08-18 | End: 2024-08-18

## 2024-08-18 RX ORDER — ACETAMINOPHEN 500 MG/5ML
1000 LIQUID (ML) ORAL EVERY 6 HOURS
Refills: 0 | Status: DISCONTINUED | OUTPATIENT
Start: 2024-08-18 | End: 2024-08-18

## 2024-08-18 RX ORDER — OXYCODONE HYDROCHLORIDE 30 MG/1
1 TABLET ORAL
Qty: 6 | Refills: 0
Start: 2024-08-18 | End: 2024-08-19

## 2024-08-18 RX ORDER — PIPERACILLIN-TAZO-DEXTROSE,ISO 3.375G/5
3.38 IV SOLUTION, PIGGYBACK PREMIX FROZEN(ML) INTRAVENOUS ONCE
Refills: 0 | Status: COMPLETED | OUTPATIENT
Start: 2024-08-18 | End: 2024-08-18

## 2024-08-18 RX ORDER — ACETAMINOPHEN 500 MG/5ML
1000 LIQUID (ML) ORAL ONCE
Refills: 0 | Status: COMPLETED | OUTPATIENT
Start: 2024-08-18 | End: 2024-08-18

## 2024-08-18 RX ORDER — OXYCODONE HYDROCHLORIDE 30 MG/1
5 TABLET ORAL ONCE
Refills: 0 | Status: DISCONTINUED | OUTPATIENT
Start: 2024-08-18 | End: 2024-08-18

## 2024-08-18 RX ORDER — ONDANSETRON HCL/PF 4 MG/2 ML
4 VIAL (ML) INJECTION ONCE
Refills: 0 | Status: COMPLETED | OUTPATIENT
Start: 2024-08-18 | End: 2024-08-18

## 2024-08-18 RX ORDER — KETOROLAC TROMETHAMINE 30 MG/ML
15 INJECTION, SOLUTION INTRAMUSCULAR; INTRAVENOUS ONCE
Refills: 0 | Status: DISCONTINUED | OUTPATIENT
Start: 2024-08-18 | End: 2024-08-18

## 2024-08-18 RX ORDER — SODIUM CHLORIDE 9 G/1000ML
1000 INJECTION, SOLUTION INTRAVENOUS
Refills: 0 | Status: DISCONTINUED | OUTPATIENT
Start: 2024-08-18 | End: 2024-08-18

## 2024-08-18 RX ADMIN — KETOROLAC TROMETHAMINE 15 MILLIGRAM(S): 30 INJECTION, SOLUTION INTRAMUSCULAR; INTRAVENOUS at 04:42

## 2024-08-18 RX ADMIN — Medication 200 GRAM(S): at 06:24

## 2024-08-18 RX ADMIN — Medication 400 MILLIGRAM(S): at 04:42

## 2024-08-18 RX ADMIN — Medication 0.5 MILLIGRAM(S): at 13:45

## 2024-08-18 RX ADMIN — Medication 0.5 MILLIGRAM(S): at 14:00

## 2024-08-18 RX ADMIN — Medication 4 MILLIGRAM(S): at 13:46

## 2024-08-19 LAB
CULTURE RESULTS: SIGNIFICANT CHANGE UP
SPECIMEN SOURCE: SIGNIFICANT CHANGE UP

## 2024-08-21 LAB — SURGICAL PATHOLOGY STUDY: SIGNIFICANT CHANGE UP

## 2024-08-29 DIAGNOSIS — K35.32 ACUTE APPENDICITIS W/ PERFORATION AND LOCALIZED PERITONITIS, W/O ABSCESS: ICD-10-CM

## 2024-08-29 PROBLEM — Z78.9 OTHER SPECIFIED HEALTH STATUS: Chronic | Status: ACTIVE | Noted: 2024-08-18

## 2024-09-09 ENCOUNTER — APPOINTMENT (OUTPATIENT)
Dept: TRAUMA SURGERY | Facility: CLINIC | Age: 34
End: 2024-09-09

## 2024-09-09 DIAGNOSIS — K35.32 ACUTE APPENDICITIS W/ PERFORATION AND LOCALIZED PERITONITIS, W/O ABSCESS: ICD-10-CM

## 2024-09-09 PROCEDURE — 99024 POSTOP FOLLOW-UP VISIT: CPT

## 2024-09-09 NOTE — HISTORY OF PRESENT ILLNESS
[de-identified] : Pathology demonstrated acute appendicitis Discharged home on 8/18 with Rx Augmentin x 4 days.  [de-identified] : tolerating regular diet without nausea or vomiting. no abdominal pain. no fevers or chills.

## 2024-09-09 NOTE — PHYSICAL EXAM
[de-identified] : appears well [de-identified] : soft / NT / ND. trocar incisions healed without infection. there was a Monocryl suture protruding from the inferior aspect of her supraumbilical incision and causing her discomfort. I removed the suture.

## (undated) DEVICE — ENDOCATCH 10MM

## (undated) DEVICE — Device

## (undated) DEVICE — DRSG TEGADERM 6 X 8"

## (undated) DEVICE — DRAPE TOWEL BLUE 17" X 24"

## (undated) DEVICE — LIGASURE BLUNT TIP 5MM X 37CM

## (undated) DEVICE — TROCAR COVIDIEN VERSAONE FIXATION CANNULA 5MM

## (undated) DEVICE — GOWN TRIMAX LG

## (undated) DEVICE — TUBING IRRIGATION DAVOL SYSTEM X STREAM

## (undated) DEVICE — MEDICATION LABELS W MARKER

## (undated) DEVICE — ELCTR BOVIE PENCIL SMOKE EVACUATION

## (undated) DEVICE — DRSG STERISTRIPS 0.5 X 4"

## (undated) DEVICE — TROCAR COVIDIEN VERSASTEP PLUS 12MM STANDARD

## (undated) DEVICE — TROCAR ETHICON ENDOPATH XCEL BLADELESS 5MM X 100MM STABILITY

## (undated) DEVICE — DISSECTOR ENDO PEANUT 5MM

## (undated) DEVICE — TROCAR APPLIED MEDICAL KII BALLOON BLUNT TIP 12MM X 100MM

## (undated) DEVICE — SUT BIOSYN 4-0 18" P-12

## (undated) DEVICE — DRAPE GENERAL ENDOSCOPY

## (undated) DEVICE — SOL IRR POUR H2O 250ML

## (undated) DEVICE — TROCAR COVIDIEN VERSASTEP PLUS 11MM STANDARD

## (undated) DEVICE — VENODYNE/SCD SLEEVE CALF MEDIUM

## (undated) DEVICE — LIGASURE MARYLAND 5MM X 37CM

## (undated) DEVICE — VALVE YELLOW PORT SEAL PLUS 5MM

## (undated) DEVICE — SUT POLYSORB 0 30" GS-23

## (undated) DEVICE — BLADE SCALPEL SAFETYLOCK #15

## (undated) DEVICE — SPECIMEN CONTAINER 100ML

## (undated) DEVICE — TROCAR APPLIED MEDICAL KII BALLOON BLUNT TIP 12MM X 130MM

## (undated) DEVICE — SOL IRR POUR NS 0.9% 500ML

## (undated) DEVICE — TROCAR COVIDIEN STEP 5MM SHORT 70MM

## (undated) DEVICE — LIGASURE IMPACT

## (undated) DEVICE — WARMING BLANKET UPPER ADULT

## (undated) DEVICE — INSUFFLATION NDL COVIDIEN STEP 14G SHORT FOR STEP/VERSASTEP

## (undated) DEVICE — POSITIONER FOAM EGG CRATE ULNAR 2PCS (PINK)

## (undated) DEVICE — INSUFFLATION NDL COVIDIEN STEP 14G FOR STEP/VERSASTEP

## (undated) DEVICE — TUBING INSUFFLATION LAP FILTER 10FT

## (undated) DEVICE — D HELP - CLEARVIEW CLEARIFY SYSTEM

## (undated) DEVICE — TROCAR COVIDIEN VERSASTEP 5MM SHORT

## (undated) DEVICE — STAPLER COVIDIEN ENDO GIA STANDARD HANDLE

## (undated) DEVICE — DRSG OPSITE 2.5 X 2"

## (undated) DEVICE — PREP CHLORAPREP HI-LITE ORANGE 26ML

## (undated) DEVICE — BLADE SCALPEL SAFETYLOCK #10

## (undated) DEVICE — DRAPE INSTRUMENT POUCH 6.75" X 11"

## (undated) DEVICE — TROCAR COVIDIEN BLUNT TIP HASSAN 12MM

## (undated) DEVICE — PACK LAP CHOLE LAP APPENDECTOMY

## (undated) DEVICE — TROCAR COVIDIEN BLUNT TIP HASSAN 10MM